# Patient Record
Sex: MALE | Race: BLACK OR AFRICAN AMERICAN | Employment: FULL TIME | URBAN - METROPOLITAN AREA
[De-identification: names, ages, dates, MRNs, and addresses within clinical notes are randomized per-mention and may not be internally consistent; named-entity substitution may affect disease eponyms.]

---

## 2018-10-22 ENCOUNTER — HOSPITAL ENCOUNTER (EMERGENCY)
Age: 38
Discharge: HOME | End: 2018-10-22
Attending: EMERGENCY MEDICINE

## 2018-10-22 VITALS
DIASTOLIC BLOOD PRESSURE: 99 MMHG | HEIGHT: 72 IN | TEMPERATURE: 98.1 F | OXYGEN SATURATION: 99 % | HEART RATE: 62 BPM | BODY MASS INDEX: 27.09 KG/M2 | SYSTOLIC BLOOD PRESSURE: 128 MMHG | WEIGHT: 200 LBS | RESPIRATION RATE: 15 BRPM

## 2018-10-22 DIAGNOSIS — T79.6XXA TRAUMATIC RHABDOMYOLYSIS, INITIAL ENCOUNTER (HCC): ICD-10-CM

## 2018-10-22 DIAGNOSIS — N34.2 URETHRITIS: ICD-10-CM

## 2018-10-22 DIAGNOSIS — E86.0 DEHYDRATION: Primary | ICD-10-CM

## 2018-10-22 LAB
BACTERIA: ABNORMAL /HPF
BILIRUBIN URINE: NEGATIVE MG/DL
BLOOD, URINE: ABNORMAL
CALCIUM IONIZED: 1.15 MMOL/L (ref 1.12–1.32)
CLARITY: ABNORMAL
CO2: 27 MMOL/L (ref 21–32)
COLOR: ABNORMAL
EPITHELIAL CELLS, UA: ABNORMAL /HPF
GFR AFRICAN AMERICAN: >60
GFR NON-AFRICAN AMERICAN: >60
GLUCOSE BLD-MCNC: 98 MG/DL (ref 70–99)
GLUCOSE URINE: NEGATIVE MG/DL
KETONES, URINE: NEGATIVE MG/DL
LEUKOCYTE ESTERASE, URINE: ABNORMAL
MICROSCOPIC EXAMINATION: YES
NITRITE, URINE: NEGATIVE
PERFORMED ON: ABNORMAL
PH UA: 7
POC ANION GAP: 13 (ref 10–20)
POC BUN: 8 MG/DL (ref 7–18)
POC CHLORIDE: 100 MMOL/L (ref 99–110)
POC CREATININE: 0.8 MG/DL (ref 0.9–1.3)
POC POTASSIUM: 3.5 MMOL/L (ref 3.5–5.1)
POC SAMPLE TYPE: ABNORMAL
POC SODIUM: 140 MMOL/L (ref 136–145)
PROTEIN UA: NEGATIVE MG/DL
RBC UA: ABNORMAL /HPF (ref 0–2)
SPECIFIC GRAVITY UA: 1.02
TOTAL CK: 350 U/L (ref 39–308)
UROBILINOGEN, URINE: 0.2 E.U./DL
WBC UA: ABNORMAL /HPF (ref 0–5)

## 2018-10-22 PROCEDURE — 96361 HYDRATE IV INFUSION ADD-ON: CPT

## 2018-10-22 PROCEDURE — 99284 EMERGENCY DEPT VISIT MOD MDM: CPT

## 2018-10-22 PROCEDURE — 87086 URINE CULTURE/COLONY COUNT: CPT

## 2018-10-22 PROCEDURE — 81001 URINALYSIS AUTO W/SCOPE: CPT

## 2018-10-22 PROCEDURE — 80047 BASIC METABLC PNL IONIZED CA: CPT

## 2018-10-22 PROCEDURE — 6370000000 HC RX 637 (ALT 250 FOR IP): Performed by: PHYSICIAN ASSISTANT

## 2018-10-22 PROCEDURE — 82550 ASSAY OF CK (CPK): CPT

## 2018-10-22 PROCEDURE — 96360 HYDRATION IV INFUSION INIT: CPT

## 2018-10-22 PROCEDURE — 6360000002 HC RX W HCPCS: Performed by: PHYSICIAN ASSISTANT

## 2018-10-22 PROCEDURE — 2580000003 HC RX 258: Performed by: PHYSICIAN ASSISTANT

## 2018-10-22 PROCEDURE — 2500000003 HC RX 250 WO HCPCS: Performed by: PHYSICIAN ASSISTANT

## 2018-10-22 PROCEDURE — 96372 THER/PROPH/DIAG INJ SC/IM: CPT

## 2018-10-22 PROCEDURE — 87591 N.GONORRHOEAE DNA AMP PROB: CPT

## 2018-10-22 RX ORDER — 0.9 % SODIUM CHLORIDE 0.9 %
1000 INTRAVENOUS SOLUTION INTRAVENOUS ONCE
Status: COMPLETED | OUTPATIENT
Start: 2018-10-22 | End: 2018-10-22

## 2018-10-22 RX ORDER — AZITHROMYCIN 250 MG/1
1000 TABLET, FILM COATED ORAL ONCE
Status: COMPLETED | OUTPATIENT
Start: 2018-10-22 | End: 2018-10-22

## 2018-10-22 RX ORDER — METRONIDAZOLE 500 MG/1
2000 TABLET ORAL ONCE
Status: COMPLETED | OUTPATIENT
Start: 2018-10-22 | End: 2018-10-22

## 2018-10-22 RX ADMIN — SODIUM CHLORIDE 1000 ML: 9 INJECTION, SOLUTION INTRAVENOUS at 21:34

## 2018-10-22 RX ADMIN — CEFTRIAXONE SODIUM 250 MG: 250 INJECTION, POWDER, FOR SOLUTION INTRAMUSCULAR; INTRAVENOUS at 21:34

## 2018-10-22 RX ADMIN — SODIUM CHLORIDE 1000 ML: 9 INJECTION, SOLUTION INTRAVENOUS at 19:40

## 2018-10-22 RX ADMIN — METRONIDAZOLE 2000 MG: 500 TABLET ORAL at 21:34

## 2018-10-22 RX ADMIN — AZITHROMYCIN 1000 MG: 250 TABLET, FILM COATED ORAL at 21:34

## 2018-10-22 NOTE — LETTER
[facility] Emergency Department  [address] [place] [id number]  Phone: [phone]  Fax: [phone]         October 22, 2018     Patient: Alberta Lefort   YOB: 1980   Date of Visit: 10/22/2018       To Whom It May Concern:    Alberta Lefort was seen and treated in our emergency department on 10/22/2018. The following work duties are recommended.     He  May return to work on his next schedules shift        Sincerely,        Nilsa Green RN        Signature:__________________________________

## 2018-10-23 LAB — N. GONORRHOEAE DNA, URINE: NEGATIVE

## 2018-10-23 ASSESSMENT — ENCOUNTER SYMPTOMS
CONSTIPATION: 0
RHINORRHEA: 0
DIARRHEA: 0
COUGH: 0
RESPIRATORY NEGATIVE: 1
SINUS PRESSURE: 0
CHEST TIGHTNESS: 0
SINUS PAIN: 0
VOMITING: 0
NAUSEA: 0
SHORTNESS OF BREATH: 0
GASTROINTESTINAL NEGATIVE: 1
EYES NEGATIVE: 1
ABDOMINAL PAIN: 0
TROUBLE SWALLOWING: 0

## 2018-10-23 NOTE — ED PROVIDER NOTES
history on file. His family history is not on file. He reports that he has been smoking Cigarettes. He does not have any smokeless tobacco history on file. He reports that he does not drink alcohol or use drugs. Medications     Previous Medications    No medications on file       Allergies     He has No Known Allergies. Physical Exam     INITIAL VITALS: BP: (!) 133/91, Temp: 98.1 °F (36.7 °C), Pulse: 55, Resp: 17, SpO2: 99 %  Physical Exam   Constitutional: He is oriented to person, place, and time. He appears well-developed and well-nourished. HENT:   Head: Normocephalic and atraumatic. Eyes: Pupils are equal, round, and reactive to light. Conjunctivae and EOM are normal. Right eye exhibits no discharge. Left eye exhibits no discharge. Neck: Normal range of motion. Neck supple. Cardiovascular: Normal rate, regular rhythm, normal heart sounds and intact distal pulses. Exam reveals no gallop and no friction rub. No murmur heard. Pulmonary/Chest: Effort normal and breath sounds normal. No respiratory distress. He has no wheezes. He has no rales. Abdominal: Soft. Bowel sounds are normal. He exhibits no distension and no mass. There is no tenderness. There is no rebound and no guarding. No hernia. Musculoskeletal: Normal range of motion. Neurological: He is alert and oriented to person, place, and time. He displays normal reflexes. Skin: Skin is warm and dry. Capillary refill takes less than 2 seconds. Psychiatric: He has a normal mood and affect. His behavior is normal.   Nursing note and vitals reviewed.        Diagnostic Results         RADIOLOGY:  No orders to display       LABS:   Results for orders placed or performed during the hospital encounter of 10/22/18   Microscopic Urinalysis   Result Value Ref Range    WBC, UA 10-20 (A) 0 - 5 /HPF    RBC, UA 3-5 (A) 0 - 2 /HPF    Epi Cells 0-2 /HPF    Bacteria, UA 1+ (A) /HPF   CK (Lab)   Result Value Ref Range    Total  (H) 39 - 308 U/L   POC URINE with Microscopic   Result Value Ref Range    Color, UA Not Entered Straw/Yellow    Clarity, UA Not Entered Clear    Glucose, Ur Negative Negative mg/dL    Bilirubin Urine Negative Negative mg/dL    Ketones, Urine Negative Negative mg/dL    Specific Gravity, UA 1.020 1.005 - 1.030    Blood, Urine TRACE-INTACT (A) Negative    pH, UA 7.0 5.0 - 8.0    Protein, UA Negative Negative mg/dL    Urobilinogen, Urine 0.2 <2.0 E.U./dL    Nitrite, Urine Negative Negative    Leukocyte Esterase, Urine SMALL (A) Negative    Microscopic Examination Yes    POCT Venous   Result Value Ref Range    POC Sodium 140 136 - 145 mmol/L    POC Potassium 3.5 3.5 - 5.1 mmol/L    POC Chloride 100 99 - 110 mmol/L    CO2 27 21 - 32 mmol/L    POC Anion Gap 13 10 - 20    POC Glucose 98 70 - 99 mg/dl    POC BUN 8 7 - 18 mg/dL    POC Creatinine 0.8 (L) 0.9 - 1.3 mg/dL    GFR Non-African American >60 >60    GFR African American >60     Calcium, Ion 1.15 1.12 - 1.32 mmol/L    Sample Type REKHA     Performed on SEE BELOW          RECENT VITALS:  BP: (!) 128/99, Temp: 98.1 °F (36.7 °C), Pulse: 62, Resp: 15, SpO2: 99 %     Procedures         ED Course     Nursing Notes, Past Medical Hx,Past Surgical Hx, Social Hx, Allergies, and Family Hx were reviewed. The patient was given the following medications:  Orders Placed This Encounter   Medications    0.9 % sodium chloride bolus    0.9 % sodium chloride bolus    cefTRIAXone (ROCEPHIN) 250 mg in lidocaine 1 % 1 mL IM Injection    azithromycin (ZITHROMAX) tablet 1,000 mg    metroNIDAZOLE (FLAGYL) tablet 2,000 mg       CONSULTS:  None    MEDICAL DECISION MAKING / ASSESSMENT / Lisa Weber is a 40 y.o. male who presented to the emergency department with fatigue, muscles aches and dysuria. On examination his abdomen is soft. He had an IV established with labs drawn. Urinalysis showed trace intact blood with 3-5 red blood cells, small leukocytes and 10-20 white blood cells.   Given

## 2018-10-24 LAB — URINE CULTURE, ROUTINE: NORMAL

## 2019-04-03 ENCOUNTER — HOSPITAL ENCOUNTER (EMERGENCY)
Age: 39
Discharge: HOME | End: 2019-04-03
Attending: EMERGENCY MEDICINE

## 2019-04-03 VITALS
TEMPERATURE: 98 F | OXYGEN SATURATION: 100 % | HEART RATE: 54 BPM | DIASTOLIC BLOOD PRESSURE: 93 MMHG | WEIGHT: 198 LBS | HEIGHT: 66 IN | BODY MASS INDEX: 31.82 KG/M2 | RESPIRATION RATE: 18 BRPM | SYSTOLIC BLOOD PRESSURE: 140 MMHG

## 2019-04-03 DIAGNOSIS — K04.7 DENTAL INFECTION: Primary | ICD-10-CM

## 2019-04-03 PROCEDURE — 99282 EMERGENCY DEPT VISIT SF MDM: CPT

## 2019-04-03 RX ORDER — PENICILLIN V POTASSIUM 500 MG/1
500 TABLET ORAL 4 TIMES DAILY
Qty: 28 TABLET | Refills: 0 | Status: SHIPPED | OUTPATIENT
Start: 2019-04-03 | End: 2019-04-10

## 2019-04-03 RX ORDER — NAPROXEN 500 MG/1
500 TABLET ORAL 2 TIMES DAILY WITH MEALS
Qty: 30 TABLET | Refills: 0 | Status: SHIPPED | OUTPATIENT
Start: 2019-04-03 | End: 2019-04-18

## 2019-04-03 ASSESSMENT — PAIN DESCRIPTION - DESCRIPTORS: DESCRIPTORS: ACHING

## 2019-04-03 ASSESSMENT — PAIN DESCRIPTION - LOCATION: LOCATION: MOUTH

## 2019-04-03 ASSESSMENT — PAIN SCALES - GENERAL: PAINLEVEL_OUTOF10: 8

## 2019-04-03 ASSESSMENT — PAIN DESCRIPTION - FREQUENCY: FREQUENCY: CONTINUOUS

## 2019-04-03 ASSESSMENT — PAIN DESCRIPTION - PAIN TYPE: TYPE: ACUTE PAIN

## 2019-04-03 NOTE — ED TRIAGE NOTES
Pt comes in to ed with dental pain 8/10 that has been ongoing for one week. Pt states cant sleep at night and has not been able to eat because of the pain.

## 2019-04-03 NOTE — ED PROVIDER NOTES
[address] [facility]          ATTENDING PHYSICIAN NOTE       Date of evaluation: 4/3/2019    Chief Complaint     Dental Pain      History of Present Illness     Catalino Alcaraz is a 45 y.o. male who presents with oral pain for a week. He believes he has multiple infected teeth. He does not have a dentist or insurance. He denies fever or difficulty breathing or swallowing. He localizes the pain to multiple different teeth and he has severe dental caries throughout his entire mouth. Review of Systems     Constitutional:  Denies fever, chills, or weakness   Eyes:  Denies photophobia or visual changes  HENT:  Denies sore throat    Respiratory:  Denies cough or shortness of breath   Lymphatic:  Denies swollen glands   All other systems negative     Past Medical, Surgical, Family, and Social History     Nursing Notes, Past Medical Hx, Past Surgical Hx, Social Hx, Allergies, and Family Hx were all reviewed in the electronic record and agreed with, or any disagreements were addressed in the HPI or corrected in the EMR    Medications     Previous Medications    No medications on file       Allergies     He has No Known Allergies. Physical Exam     INITIAL VITALS: BP: (!) 141/101, Temp: 98 °F (36.7 °C), Pulse: 54, Resp: 18, SpO2: 100 %   Constitutional:  Well developed, Well nourished, No acute distress, Non-toxic appearance. HENT:  Normocephalic, Atraumatic, Oropharynx moist, Teeth - multiple necrotic teeth, too numerous to count. There are no obvious discernible abscesses on the gums and there is no sublingual swelling or buccal swelling. Nose normal.  Neck:  Normal range of motion, No tenderness, Supple, No stridor. Eyes:  PERRL, EOMI, Conjunctiva normal, No discharge. Integument:  Warm, Dry, No erythema, No rash.    Lymphatic:  No lymphadenopathy noted    ED Course     The patient was given the following medications:  Orders Placed This Encounter   Medications    penicillin v potassium (VEETID) 500 MG tablet     Sig: Take 1 tablet by mouth 4 times daily for 7 days     Dispense:  28 tablet     Refill:  0    naproxen (NAPROSYN) 500 MG tablet     Sig: Take 1 tablet by mouth 2 times daily (with meals) for 30 doses     Dispense:  30 tablet     Refill:  0       MEDICAL DECISION MAKING / ASSESSMENT / PLAN     This patient presents with tooth pain. The patient has overall a very benign exam. There is no significant adenopathy and there are no systemic symptoms. This patient's condition is not warranting any type of surgical intervention at this time. He can be treated in an outpatient setting with pain medication and antibiotics. He will be asked to followup with  oral surgery and will be given some information on other dental clinics in the area. Clinical Impression     1.  Dental infection        Disposition     PATIENT REFERRED TO:  [facility]  [address] [place] [address]  [phone]            DISCHARGE MEDICATIONS:  New Prescriptions    NAPROXEN (NAPROSYN) 500 MG TABLET    Take 1 tablet by mouth 2 times daily (with meals) for 30 doses    PENICILLIN V POTASSIUM (VEETID) 500 MG TABLET    Take 1 tablet by mouth 4 times daily for 7 days       DISPOSITION Decision To Discharge 04/03/2019 08:12:25 AM        Luke Valenzuela MD  04/03/19 [id number]

## 2024-08-08 PROBLEM — Z86.19 HISTORY OF SYPHILIS: Status: ACTIVE | Noted: 2024-08-08

## 2025-03-03 ENCOUNTER — HOSPITAL ENCOUNTER (EMERGENCY)
Age: 45
Discharge: LEFT BEFORE DISCHARGE | End: 2025-03-03

## 2025-03-03 VITALS
BODY MASS INDEX: 28.54 KG/M2 | WEIGHT: 192.68 LBS | RESPIRATION RATE: 15 BRPM | OXYGEN SATURATION: 99 % | SYSTOLIC BLOOD PRESSURE: 141 MMHG | TEMPERATURE: 97.7 F | HEIGHT: 69 IN | DIASTOLIC BLOOD PRESSURE: 97 MMHG | HEART RATE: 78 BPM

## 2025-03-03 LAB
BACTERIA URNS QL MICRO: ABNORMAL /HPF
BILIRUB UR QL STRIP.AUTO: NEGATIVE
CLARITY UR: CLEAR
COLOR UR: YELLOW
GLUCOSE UR STRIP.AUTO-MCNC: NEGATIVE MG/DL
HGB UR QL STRIP.AUTO: NEGATIVE
KETONES UR STRIP.AUTO-MCNC: NEGATIVE MG/DL
LEUKOCYTE ESTERASE UR QL STRIP.AUTO: ABNORMAL
NITRITE UR QL STRIP.AUTO: NEGATIVE
PH UR STRIP.AUTO: 6 [PH] (ref 5–8)
PROT UR STRIP.AUTO-MCNC: NEGATIVE MG/DL
RBC #/AREA URNS HPF: ABNORMAL /HPF (ref 0–4)
SP GR UR STRIP.AUTO: >=1.03 (ref 1–1.03)
UA DIPSTICK W REFLEX MICRO PNL UR: YES
URN SPEC COLLECT METH UR: ABNORMAL
UROBILINOGEN UR STRIP-ACNC: 0.2 E.U./DL
WBC #/AREA URNS HPF: ABNORMAL /HPF (ref 0–5)

## 2025-03-03 PROCEDURE — 81001 URINALYSIS AUTO W/SCOPE: CPT

## 2025-03-03 PROCEDURE — 87591 N.GONORRHOEAE DNA AMP PROB: CPT

## 2025-03-03 PROCEDURE — 99283 EMERGENCY DEPT VISIT LOW MDM: CPT

## 2025-03-03 PROCEDURE — 87491 CHLMYD TRACH DNA AMP PROBE: CPT

## 2025-03-03 ASSESSMENT — PAIN - FUNCTIONAL ASSESSMENT: PAIN_FUNCTIONAL_ASSESSMENT: NONE - DENIES PAIN

## 2025-03-04 LAB
C TRACH DNA UR QL NAA+PROBE: NEGATIVE
N GONORRHOEA DNA UR QL NAA+PROBE: NEGATIVE

## 2025-04-12 ENCOUNTER — HOSPITAL ENCOUNTER (EMERGENCY)
Age: 45
Discharge: HOME | End: 2025-04-12
Attending: EMERGENCY MEDICINE

## 2025-04-12 VITALS
OXYGEN SATURATION: 95 % | HEIGHT: 69 IN | SYSTOLIC BLOOD PRESSURE: 162 MMHG | DIASTOLIC BLOOD PRESSURE: 93 MMHG | TEMPERATURE: 98.6 F | RESPIRATION RATE: 16 BRPM | HEART RATE: 63 BPM | WEIGHT: 188.7 LBS | BODY MASS INDEX: 27.95 KG/M2

## 2025-04-12 DIAGNOSIS — K12.2 ORAL ABSCESS: Primary | ICD-10-CM

## 2025-04-12 PROCEDURE — 99283 EMERGENCY DEPT VISIT LOW MDM: CPT

## 2025-04-12 ASSESSMENT — LIFESTYLE VARIABLES
HOW OFTEN DO YOU HAVE A DRINK CONTAINING ALCOHOL: NEVER
HOW MANY STANDARD DRINKS CONTAINING ALCOHOL DO YOU HAVE ON A TYPICAL DAY: PATIENT DOES NOT DRINK

## 2025-04-12 ASSESSMENT — PAIN DESCRIPTION - LOCATION: LOCATION: MOUTH

## 2025-04-12 ASSESSMENT — PAIN SCALES - GENERAL: PAINLEVEL_OUTOF10: 10

## 2025-04-12 ASSESSMENT — PAIN - FUNCTIONAL ASSESSMENT: PAIN_FUNCTIONAL_ASSESSMENT: 0-10

## 2025-04-12 NOTE — DISCHARGE INSTRUCTIONS
Continue amoxicillin.  Rinse mouth several times a day with Listerine.  Follow-up as directed.  Return for any other emergencies.

## 2025-04-12 NOTE — ED PROVIDER NOTES
[facility] EMERGENCY DEPARTMENT  EMERGENCY DEPARTMENTENCOUNTER      Pt Name: Catalino Escalona  MRN: [id number]  Birthdate 1980  Date ofevaluation: 4/12/2025  Provider: Ashutosh Sultana MD    CHIEF COMPLAINT       Chief Complaint   Patient presents with    Abscess     Pt arrives from home by [facility]. Pt C/O abscess on top of his mouth for 4 days. Pt states that he was seen at a clinic and given tylenol and amoxicillin but no relief. Pt rates his pain a 10 out of 10.        HPI    HISTORY OF PRESENT ILLNESS   (Location/Symptom, Timing/Onset,Context/Setting, Quality, Duration, Modifying Factors, Severity)  Note limiting factors.   Catalino Escalona is a 44 y.o. male who presents to the emergency department with an \"abscess\" on his mouth.  This is a 44-year-old male who presents with an abscess on the top of his mouth he has had for the last 4 days.  The patient was apparently recently seen and given amoxicillin for this without relief.  He denies any fevers or chills.  He denies any nausea or vomiting.        NursingNotes were reviewed.    Review of Systems    REVIEW OF SYSTEMS    (2-9 systems for level 4, 10 or more for level 5)     Review of Systems   Constitutional: Negative for fever.   HENT: Negative for rhinorrhea and sore throat.    Eyes: Negative for redness.   Respiratory: Negative for shortness of breath.    Cardiovascular: Negative for chest pain.   Gastrointestinal: Negative for abdominal pain.   Genitourinary: Negative for flank pain.   Neurological: Negative for headaches.   Hematological: Negative for adenopathy.   Psychiatric/Behavioral: Negative for confusion.              Except as noted above the remainder of the review of systems was reviewed and negative.       PAST MEDICAL HISTORY   History reviewed. No pertinent past medical history.      SURGICALHISTORY     History reviewed. No pertinent surgical history.      CURRENT MEDICATIONS       Previous Medications    No medications on  file       ALLERGIES     Shrimp flavor agent (non-screening)    FAMILY HISTORY     History reviewed. No pertinent family history.       SOCIAL HISTORY       Social History     Socioeconomic History    Marital status: Single     Spouse name: None    Number of children: None    Years of education: None    Highest education level: None   Tobacco Use    Smoking status: Every Day     Current packs/day: 1.00     Average packs/day: 1 pack/day for 18.0 years (18.0 ttl pk-yrs)     Types: Cigarettes    Smokeless tobacco: Never   Vaping Use    Vaping status: Never Used   Substance and Sexual Activity    Alcohol use: Not Currently    Drug use: No    Sexual activity: Yes     Partners: Female     Social Drivers of Health      [facility]    Food Insecurities    [facility] from [facility]    Transportation    Received from [facility]    Interpersonal Safety    Received from [facility], [facility]    Housing/Utilities       SCREENINGS    Roxy Coma Scale  Eye Opening: Spontaneous  Best Verbal Response: Oriented  Best Motor Response: Obeys commands  [place] Coma Scale Score: 15        PHYSICAL EXAM    (up to 7 for level 4, 8 or more for level 5)     ED Triage Vitals [04/12/25 0705]   BP Systolic BP Percentile Diastolic BP Percentile Temp Temp Source Pulse Respirations SpO2   (!) 162/93 -- -- 98.6 °F (37 °C) Oral 63 16 95 %      Height Weight - Scale         1.753 m (5' 9\") 85.6 kg (188 lb 11.2 oz)             Physical Exam:      General Appearance:  Alert, cooperative, appears stated age.   Head:  Normocephalic, without obvious abnormality, atraumatic.   Eyes:  conjunctiva/corneas clear, EOM's intact.  Sclera anicteric.   ENT: Oropharynx shows a

## 2025-08-17 ENCOUNTER — HOSPITAL ENCOUNTER (EMERGENCY)
Age: 45
Discharge: LEFT BEFORE DISCHARGE | End: 2025-08-17

## 2025-08-17 PROCEDURE — 4500000002 HC ER NO CHARGE
